# Patient Record
Sex: FEMALE | URBAN - METROPOLITAN AREA
[De-identification: names, ages, dates, MRNs, and addresses within clinical notes are randomized per-mention and may not be internally consistent; named-entity substitution may affect disease eponyms.]

---

## 2024-02-26 ENCOUNTER — OFFICE VISIT (OUTPATIENT)
Dept: SURGERY | Facility: CLINIC | Age: 59
End: 2024-02-26

## 2024-02-26 DIAGNOSIS — R82.90 URINE FINDING: Primary | ICD-10-CM

## 2024-02-26 DIAGNOSIS — N39.0 RECURRENT UTI: ICD-10-CM

## 2024-02-26 LAB
APPEARANCE: CLEAR
BILIRUBIN: NEGATIVE
GLUCOSE (URINE DIPSTICK): NEGATIVE MG/DL
KETONES (URINE DIPSTICK): NEGATIVE MG/DL
LEUKOCYTES: NEGATIVE
MULTISTIX LOT#: NORMAL NUMERIC
NITRITE, URINE: NEGATIVE
OCCULT BLOOD: NEGATIVE
PH, URINE: 5.5 (ref 4.5–8)
PROTEIN (URINE DIPSTICK): NEGATIVE MG/DL
SPECIFIC GRAVITY: <=1.005 (ref 1–1.03)
UROBILINOGEN,SEMI-QN: 0.2 MG/DL (ref 0–1.9)

## 2024-02-26 RX ORDER — METHENAMINE HIPPURATE 1000 MG/1
1 TABLET ORAL 2 TIMES DAILY
Qty: 60 TABLET | Refills: 3 | Status: SHIPPED | OUTPATIENT
Start: 2024-02-26

## 2024-02-26 RX ORDER — TRAZODONE HYDROCHLORIDE 150 MG/1
TABLET ORAL
COMMUNITY
Start: 2023-09-08

## 2024-02-26 RX ORDER — FLUTICASONE PROPIONATE 50 MCG
2 SPRAY, SUSPENSION (ML) NASAL DAILY
COMMUNITY
Start: 2023-11-15 | End: 2024-11-14

## 2024-02-26 RX ORDER — LEVOTHYROXINE SODIUM 88 UG/1
1 TABLET ORAL
COMMUNITY

## 2024-02-26 RX ORDER — PRAVASTATIN SODIUM 20 MG
TABLET ORAL
COMMUNITY

## 2024-02-26 RX ORDER — ESTRADIOL 0.1 MG/G
CREAM VAGINAL
Qty: 42.5 G | Refills: 5 | Status: SHIPPED | OUTPATIENT
Start: 2024-02-26

## 2024-02-26 RX ORDER — ESCITALOPRAM OXALATE 10 MG/1
1 TABLET ORAL DAILY
COMMUNITY
Start: 2023-07-30

## 2024-02-26 NOTE — PROGRESS NOTES
Urology Clinic Note - New Patient    Referring Provider:  No referring provider defined for this encounter.     Primary Care Provider:  No primary care provider on file.     Chief Complaint:   fUTI     HPI:   Pallavi Pineda is a 58 year old female with history of hypothyroidism, HLD, depression referred for frequent UTI.    Patient has not previously been seen in our system.  She reports no significant previous urologic history.  Over the last 4 months she has developed frequent UTI.  She reports feeling frequency, urgency, dysuria.  She has gone to the immediate care each time and has had a positive UA.  She is unsure if the cultures were positive but she believes they were.  She has been treated with antibiotics each time.  Asides from 1 UA from October which shows nitrite positive urine, she has no other records available.  Her symptoms typically improve after antibiotics.  She has no history of kidney stones.  She denies any gross hematuria.  There is no imaging in our system.  Today, she feels well.  No urinary symptoms.  At baseline she has no urinary complaints.  She has previously been given vaginal estrogen but has not really used.   Former smoker.   No significant constipation.  Does struggle with water intake, is trying to improve this.  Infections do not appear to be postcoital.    Post-void residual bladder scan: 150 mL--> 0mL after double void   Urinalysis (clinic dip): NEGAIVE  pH 5.5       History:   No past medical history on file.    No past surgical history on file.    No family history on file.    Social History     Socioeconomic History    Marital status:        Medications (Active prior to today's visit):  Current Outpatient Medications   Medication Sig Dispense Refill    escitalopram 10 MG Oral Tab Take 1 tablet (10 mg total) by mouth daily.      fluticasone propionate 50 MCG/ACT Nasal Suspension 2 sprays by Nasal route daily.      levothyroxine 88 MCG Oral Tab Take 1 tablet (88 mcg  total) by mouth before breakfast.      pravastatin 20 MG Oral Tab Take 1 tablet every day by oral route at bedtime.      traZODone 150 MG Oral Tab TAKE 1 TABLET BY MOUTH AT BEDTIME-schedule appointment         Allergies:  Allergies   Allergen Reactions    Penicillins OTHER (SEE COMMENTS) and RASH     Not sure it was long time ago         Review of Systems:   A comprehensive 10-point review of systems was completed.  Pertinent positives and negatives are noted in the the HPI.    Physical Exam:   CONSTITUTIONAL: Well developed, well nourished, in no acute distress  NEUROLOGIC: Alert and oriented  HEAD: Normocephalic, atraumatic  EYES: Sclera non-icteric  ENT: Hearing intact, moist mucous membranes  NECK: No obvious goiter or masses  RESPIRATORY: Normal respiratory effort  SKIN: No evident rashes  ABDOMEN: Soft, non-tender, non-distended     Assessment & Plan:   Pallavi Pineda is a 58 year old female with history of hypothyroidism, HLD, depression referred for frequent UTI.    -Good hydration  -Timed and double voiding (double void today with emptying of additioanl 150cc)  -Avoid constipation  -Start methenamine (patient ?ecoli on a previous culture); pH Is appropriate as patient takes vitamin C daily   -Start women's probiotic including lactobacillus  -Start cranberry supplement  -Void before and after sexual activity  -Use pH balance soaps  -Prescribed topical vaginal estrogen cream  -Return to clinic for cystoscopy and pelvic exam   - CT stone protocol to evaluate for anatomic causes of infection       If this persists, can consider initiating low dose antibiotic prophylaxis for 6 months versus gentamicin irrigations if she would like a more local therapy.     Thank you for this consult.    I have personally reviewed all relevant medical records, labs, and imaging.      Alonzo Bennett MD  Staff Urologist  [facility]  Office: [phone]

## 2025-07-18 RX ORDER — ESTRADIOL 0.1 MG/G
CREAM VAGINAL
Qty: 43 G | Refills: 0 | OUTPATIENT
Start: 2025-07-18

## 2025-07-18 NOTE — TELEPHONE ENCOUNTER
Pt's last OV was on 2/26/24.   No future appts.   Refill denied.   Called pt to discuss appt, no answer, left message to call back.